# Patient Record
Sex: FEMALE | Race: WHITE | HISPANIC OR LATINO | URBAN - METROPOLITAN AREA
[De-identification: names, ages, dates, MRNs, and addresses within clinical notes are randomized per-mention and may not be internally consistent; named-entity substitution may affect disease eponyms.]

---

## 2017-08-18 ENCOUNTER — EMERGENCY (EMERGENCY)
Facility: HOSPITAL | Age: 52
LOS: 1 days | Discharge: HOME | End: 2017-08-18
Attending: EMERGENCY MEDICINE | Admitting: EMERGENCY MEDICINE
Payer: MEDICAID

## 2017-08-18 VITALS
RESPIRATION RATE: 16 BRPM | TEMPERATURE: 99 F | OXYGEN SATURATION: 100 % | DIASTOLIC BLOOD PRESSURE: 79 MMHG | SYSTOLIC BLOOD PRESSURE: 119 MMHG | HEART RATE: 71 BPM

## 2017-08-18 VITALS
OXYGEN SATURATION: 100 % | TEMPERATURE: 99 F | HEART RATE: 69 BPM | DIASTOLIC BLOOD PRESSURE: 75 MMHG | SYSTOLIC BLOOD PRESSURE: 135 MMHG | RESPIRATION RATE: 16 BRPM

## 2017-08-18 PROCEDURE — 99285 EMERGENCY DEPT VISIT HI MDM: CPT

## 2017-08-18 PROCEDURE — 76642 ULTRASOUND BREAST LIMITED: CPT | Mod: 26,RT

## 2017-08-18 NOTE — ED ADULT TRIAGE NOTE - CHIEF COMPLAINT QUOTE
Pt c/o redness and swelling with open wound under right breast.  S/P silicone breast implant 4 months ago.  Denies fever

## 2017-08-18 NOTE — ED PROVIDER NOTE - SKIN WOUND DESCRIPTION
Breast exam chaperoned by RIDDHI Olivera with inferior medial areola incision with TTP and visible sutures. No drainage.

## 2017-08-18 NOTE — ED PROVIDER NOTE - ATTENDING CONTRIBUTION TO CARE
Dr. Dahl:  I performed a face to face bedside interview with patient regarding history of present illness, review of symptoms and past medical history. I completed an independent physical exam.  I have discussed patient's plan of care with PA.   I agree with note as stated above, having amended the EMR as needed to reflect my findings.   This includes HISTORY OF PRESENT ILLNESS, HIV, PAST MEDICAL/SURGICAL/FAMILY/SOCIAL HISTORY, ALLERGIES AND HOME MEDICATIONS, REVIEW OF SYSTEMS, PHYSICAL EXAM, and any PROGRESS NOTES during the time I functioned as the attending physician for this patient.    51F h/o silicone implant 4 months ago in [facility] presents with right breast pain around the incision area.  Suture hanging out of incision site; had similar suture on left which patient pulled out but this one lodged and now causing some pain.  Denies fevers and other constitutional symptoms.    Exam:  - nad  - rrr  - ctab   - abd soft, ntnd  - +suture visible inferior to alveola on right breast with no surrounding erythema, induration, or purulence    A/P  - normal post-op changes vs infection  - ultrasound breast  - surgery consult Dr. Dahl:  I performed a face to face bedside interview with patient regarding history of present illness, review of symptoms and past medical history. I completed an independent physical exam.  I have discussed patient's plan of care with PA.   I agree with note as stated above, having amended the EMR as needed to reflect my findings.   This includes HISTORY OF PRESENT ILLNESS, HIV, PAST MEDICAL/SURGICAL/FAMILY/SOCIAL HISTORY, ALLERGIES AND HOME MEDICATIONS, REVIEW OF SYSTEMS, PHYSICAL EXAM, and any PROGRESS NOTES during the time I functioned as the attending physician for this patient.    51F h/o silicone implant 4 months ago in [facility] presents with right breast pain around the incision area.  Suture hanging out of incision site; had similar suture on left which patient pulled out but this one lodged and now causing some pain.  Denies fevers and other constitutional symptoms.    Exam:  - nad  - rrr  - ctab   - abd soft, ntnd  - +suture visible inferior to alveola on right breast with no surrounding erythema, induration, or purulence    A/P  - normal post-op changes vs infection  - ultrasound breast  - surgery consult for follow up

## 2017-08-18 NOTE — ED PROVIDER NOTE - PROGRESS NOTE DETAILS
mari dukes: pt to be signed out to night team- called US prelim was no obvious collection. awaiting offical read. pt stable. spoke to Dr. Armendariz- no abscess then pt to f.u outpatient no fluid collection- dc home with plastics f.u agrees with plan, return precautions given.

## 2017-08-18 NOTE — ED PROVIDER NOTE - MEDICAL DECISION MAKING DETAILS
52 y/o female, s/p breast argumentation 4 months ago, with right breast pain. Will obtain US of right breaths r/o abscess and surgery consult as pt has no f/u. 50 y/o female, s/p breast argumentation 4 months ago, with right breast pain. Will obtain US of right breast r/o abscess. Consult plastics as needed/ for followup.

## 2017-08-18 NOTE — ED PROVIDER NOTE - PLAN OF CARE
Rest, drink plenty of fluids.  Advance activity as tolerated.  Continue all previously prescribed medications as directed. Follow up with Plastic Surgery- Referral lists provided. Follow up with your primary care physician in 48-72 hours- bring copies of your results.  Return to the Emergency Department for fevers, worsening or persistent symptoms OR ANY NEW OR CONCERNING SYMPTOMS.

## 2017-08-18 NOTE — ED PROVIDER NOTE - CARE PLAN
Instructions for follow-up, activity and diet:	Rest, drink plenty of fluids.  Advance activity as tolerated.  Continue all previously prescribed medications as directed. Follow up with Plastic Surgery- Referral lists provided. Follow up with your primary care physician in 48-72 hours- bring copies of your results.  Return to the Emergency Department for fevers, worsening or persistent symptoms OR ANY NEW OR CONCERNING SYMPTOMS. Principal Discharge DX:	Breast pain, right  Instructions for follow-up, activity and diet:	Rest, drink plenty of fluids.  Advance activity as tolerated.  Continue all previously prescribed medications as directed. Follow up with Plastic Surgery- Referral lists provided. Follow up with your primary care physician in 48-72 hours- bring copies of your results.  Return to the Emergency Department for fevers, worsening or persistent symptoms OR ANY NEW OR CONCERNING SYMPTOMS.

## 2017-08-18 NOTE — ED PROVIDER NOTE - OBJECTIVE STATEMENT
50 y/o female, with no significant PMHx, presents to the ED s/p breast augmentation with silicone 4 months ago in [place]. Pt was not followed up since. Presents to the ED with right breast pain. Reports removing scab from incision site and noticing suture strings, which pt was unable to remove. Denies fever, chills, CP, SOB, urinary Sxs, and any other complaints. Pt is postmenopause.

## 2018-08-01 ENCOUNTER — OUTPATIENT (OUTPATIENT)
Dept: OUTPATIENT SERVICES | Facility: HOSPITAL | Age: 53
LOS: 1 days | End: 2018-08-01
Payer: MEDICAID

## 2018-08-01 VITALS
WEIGHT: 151.68 LBS | SYSTOLIC BLOOD PRESSURE: 116 MMHG | TEMPERATURE: 98 F | DIASTOLIC BLOOD PRESSURE: 75 MMHG | OXYGEN SATURATION: 97 % | RESPIRATION RATE: 16 BRPM | HEART RATE: 72 BPM | HEIGHT: 63 IN

## 2018-08-01 VITALS — WEIGHT: 151.68 LBS | HEIGHT: 63 IN

## 2018-08-01 DIAGNOSIS — Z98.890 OTHER SPECIFIED POSTPROCEDURAL STATES: Chronic | ICD-10-CM

## 2018-08-01 DIAGNOSIS — T83.711S: ICD-10-CM

## 2018-08-01 DIAGNOSIS — Z98.82 BREAST IMPLANT STATUS: Chronic | ICD-10-CM

## 2018-08-01 DIAGNOSIS — N94.9 UNSPECIFIED CONDITION ASSOCIATED WITH FEMALE GENITAL ORGANS AND MENSTRUAL CYCLE: ICD-10-CM

## 2018-08-01 LAB
APPEARANCE UR: CLEAR — SIGNIFICANT CHANGE UP
BACTERIA # UR AUTO: ABNORMAL /HPF
BILIRUB UR-MCNC: NEGATIVE — SIGNIFICANT CHANGE UP
COLOR SPEC: YELLOW — SIGNIFICANT CHANGE UP
COMMENT - URINE: SIGNIFICANT CHANGE UP
DIFF PNL FLD: ABNORMAL
EPI CELLS # UR: ABNORMAL
GLUCOSE UR QL: NEGATIVE — SIGNIFICANT CHANGE UP
GRAN CASTS # UR COMP ASSIST: ABNORMAL
HCT VFR BLD CALC: 41.5 % — SIGNIFICANT CHANGE UP (ref 34.5–45)
HGB BLD-MCNC: 13.2 G/DL — SIGNIFICANT CHANGE UP (ref 11.5–15.5)
KETONES UR-MCNC: NEGATIVE — SIGNIFICANT CHANGE UP
LEUKOCYTE ESTERASE UR-ACNC: ABNORMAL
MCHC RBC-ENTMCNC: 28.1 PG — SIGNIFICANT CHANGE UP (ref 27–34)
MCHC RBC-ENTMCNC: 31.7 GM/DL — LOW (ref 32–36)
MCV RBC AUTO: 88.6 FL — SIGNIFICANT CHANGE UP (ref 80–100)
NITRITE UR-MCNC: NEGATIVE — SIGNIFICANT CHANGE UP
PH UR: 6 — SIGNIFICANT CHANGE UP (ref 5–8)
PLATELET # BLD AUTO: 241 K/UL — SIGNIFICANT CHANGE UP (ref 150–400)
PROT UR-MCNC: 15
RBC # BLD: 4.68 M/UL — SIGNIFICANT CHANGE UP (ref 3.8–5.2)
RBC # FLD: 12 % — SIGNIFICANT CHANGE UP (ref 10.3–14.5)
RBC CASTS # UR COMP ASSIST: SIGNIFICANT CHANGE UP /HPF (ref 0–4)
SP GR SPEC: 1.02 — SIGNIFICANT CHANGE UP (ref 1.01–1.02)
UROBILINOGEN FLD QL: NEGATIVE — SIGNIFICANT CHANGE UP
WBC # BLD: 5.8 K/UL — SIGNIFICANT CHANGE UP (ref 3.8–10.5)
WBC # FLD AUTO: 5.8 K/UL — SIGNIFICANT CHANGE UP (ref 3.8–10.5)
WBC UR QL: SIGNIFICANT CHANGE UP /HPF (ref 0–5)

## 2018-08-01 PROCEDURE — 81001 URINALYSIS AUTO W/SCOPE: CPT

## 2018-08-01 PROCEDURE — 87086 URINE CULTURE/COLONY COUNT: CPT

## 2018-08-01 PROCEDURE — 85027 COMPLETE CBC AUTOMATED: CPT

## 2018-08-01 PROCEDURE — G0463: CPT

## 2018-08-01 PROCEDURE — 93005 ELECTROCARDIOGRAM TRACING: CPT

## 2018-08-01 PROCEDURE — 36415 COLL VENOUS BLD VENIPUNCTURE: CPT

## 2018-08-01 PROCEDURE — 93010 ELECTROCARDIOGRAM REPORT: CPT | Mod: NC

## 2018-08-01 NOTE — H&P PST ADULT - PSH
This note was copied from the mother's chart.  Reviewed the risks of supplementation.  Discussed the adequacy of colostrum.  Instructed on normal  feeding and sleeping patterns.  Encouraged mother to feed the infant on cue, a minimum of 8 times in 24 hours prior to supplementation to promote appropriate breast stimulation for adequate milk supply.  Discussed preferred alternative feeding methods, such as supplementing the infant via breast with SNS, syringe feeding, cup feeding, spoon feeding and finger feeding.  Discussed risks and encouraged to avoid artificial bottles and nipples.  Chooses to supplement via bottle.  Safely taught how to feed infant via chosen method.  Demonstrated by nurse and pt return demonstrates proper and safe usage.  States understand and provided appropriate recall of all information.   H/O breast augmentation  2017  H/O partial cystectomy  left breast benign 03/2018  H/O vaginal surgery  Transvaginal mesh implanted 2016  History of abdominoplasty  2015

## 2018-08-01 NOTE — H&P PST ADULT - NSANTHOSAYNRD_GEN_A_CORE
No. TIESHA screening performed.  STOP BANG Legend: 0-2 = LOW Risk; 3-4 = INTERMEDIATE Risk; 5-8 = HIGH Risk

## 2018-08-01 NOTE — H&P PST ADULT - FAMILY HISTORY
Mother  Still living? Yes, Estimated age: Age Unknown  Family history of esophageal cancer, Age at diagnosis: Age Unknown  Family history of breast cancer, Age at diagnosis: Age Unknown     Father  Still living? Yes, Estimated age: Age Unknown  Family history of Alzheimer's disease, Age at diagnosis: Age Unknown     Sibling  Still living? Yes, Estimated age: Age Unknown  Family history of cervical cancer, Age at diagnosis: Age Unknown

## 2018-08-01 NOTE — H&P PST ADULT - HISTORY OF PRESENT ILLNESS
51 y/o female presents for PST. As per patient she was experiencing urinary incontinence and had vaginal mesh implanted which is now causing problems during sexual intercourse. Pt followed up with Dr. Munoz who referred her for excision of mesh.

## 2018-08-02 LAB
CULTURE RESULTS: SIGNIFICANT CHANGE UP
SPECIMEN SOURCE: SIGNIFICANT CHANGE UP

## 2018-08-09 ENCOUNTER — TRANSCRIPTION ENCOUNTER (OUTPATIENT)
Age: 53
End: 2018-08-09

## 2018-08-10 ENCOUNTER — RESULT REVIEW (OUTPATIENT)
Age: 53
End: 2018-08-10

## 2018-08-10 ENCOUNTER — OUTPATIENT (OUTPATIENT)
Dept: OUTPATIENT SERVICES | Facility: HOSPITAL | Age: 53
LOS: 1 days | End: 2018-08-10
Payer: MEDICAID

## 2018-08-10 VITALS
OXYGEN SATURATION: 95 % | TEMPERATURE: 97 F | SYSTOLIC BLOOD PRESSURE: 126 MMHG | HEART RATE: 68 BPM | DIASTOLIC BLOOD PRESSURE: 80 MMHG | RESPIRATION RATE: 16 BRPM

## 2018-08-10 VITALS
SYSTOLIC BLOOD PRESSURE: 126 MMHG | DIASTOLIC BLOOD PRESSURE: 72 MMHG | HEIGHT: 63 IN | HEART RATE: 72 BPM | RESPIRATION RATE: 16 BRPM | TEMPERATURE: 98 F | OXYGEN SATURATION: 98 % | WEIGHT: 151.68 LBS

## 2018-08-10 DIAGNOSIS — Z98.890 OTHER SPECIFIED POSTPROCEDURAL STATES: Chronic | ICD-10-CM

## 2018-08-10 DIAGNOSIS — T83.711S: ICD-10-CM

## 2018-08-10 DIAGNOSIS — Z98.82 BREAST IMPLANT STATUS: Chronic | ICD-10-CM

## 2018-08-10 PROCEDURE — 88300 SURGICAL PATH GROSS: CPT | Mod: 26

## 2018-08-10 PROCEDURE — 88300 SURGICAL PATH GROSS: CPT

## 2018-08-10 PROCEDURE — 57295 REVISE VAG GRAFT VIA VAGINA: CPT

## 2018-08-10 PROCEDURE — C1763: CPT

## 2018-08-10 RX ORDER — TRAMADOL HYDROCHLORIDE 50 MG/1
1 TABLET ORAL
Qty: 20 | Refills: 0 | OUTPATIENT
Start: 2018-08-10

## 2018-08-10 RX ORDER — OXYCODONE AND ACETAMINOPHEN 5; 325 MG/1; MG/1
1 TABLET ORAL EVERY 4 HOURS
Qty: 0 | Refills: 0 | Status: DISCONTINUED | OUTPATIENT
Start: 2018-08-10 | End: 2018-08-10

## 2018-08-10 RX ORDER — SODIUM CHLORIDE 9 MG/ML
1000 INJECTION, SOLUTION INTRAVENOUS
Qty: 0 | Refills: 0 | Status: DISCONTINUED | OUTPATIENT
Start: 2018-08-10 | End: 2018-08-10

## 2018-08-10 RX ORDER — ACETAMINOPHEN 500 MG
1000 TABLET ORAL ONCE
Qty: 0 | Refills: 0 | Status: COMPLETED | OUTPATIENT
Start: 2018-08-10 | End: 2018-08-10

## 2018-08-10 RX ORDER — ONDANSETRON 8 MG/1
4 TABLET, FILM COATED ORAL ONCE
Qty: 0 | Refills: 0 | Status: DISCONTINUED | OUTPATIENT
Start: 2018-08-10 | End: 2018-08-10

## 2018-08-10 RX ORDER — TRAMADOL HYDROCHLORIDE 50 MG/1
1 TABLET ORAL
Qty: 0 | Refills: 0 | COMMUNITY

## 2018-08-10 RX ORDER — OXYCODONE AND ACETAMINOPHEN 5; 325 MG/1; MG/1
1 TABLET ORAL ONCE
Qty: 0 | Refills: 0 | Status: DISCONTINUED | OUTPATIENT
Start: 2018-08-10 | End: 2018-08-10

## 2018-08-10 RX ORDER — HYDROMORPHONE HYDROCHLORIDE 2 MG/ML
0.5 INJECTION INTRAMUSCULAR; INTRAVENOUS; SUBCUTANEOUS ONCE
Qty: 0 | Refills: 0 | Status: DISCONTINUED | OUTPATIENT
Start: 2018-08-10 | End: 2018-08-10

## 2018-08-10 RX ORDER — OXYCODONE HYDROCHLORIDE 5 MG/1
5 TABLET ORAL EVERY 6 HOURS
Qty: 0 | Refills: 0 | Status: DISCONTINUED | OUTPATIENT
Start: 2018-08-10 | End: 2018-08-10

## 2018-08-10 RX ORDER — HYDROMORPHONE HYDROCHLORIDE 2 MG/ML
0.5 INJECTION INTRAMUSCULAR; INTRAVENOUS; SUBCUTANEOUS
Qty: 0 | Refills: 0 | Status: DISCONTINUED | OUTPATIENT
Start: 2018-08-10 | End: 2018-08-10

## 2018-08-10 RX ADMIN — SODIUM CHLORIDE 50 MILLILITER(S): 9 INJECTION, SOLUTION INTRAVENOUS at 07:25

## 2018-08-10 RX ADMIN — HYDROMORPHONE HYDROCHLORIDE 0.5 MILLIGRAM(S): 2 INJECTION INTRAMUSCULAR; INTRAVENOUS; SUBCUTANEOUS at 12:35

## 2018-08-10 RX ADMIN — HYDROMORPHONE HYDROCHLORIDE 0.5 MILLIGRAM(S): 2 INJECTION INTRAMUSCULAR; INTRAVENOUS; SUBCUTANEOUS at 13:05

## 2018-08-10 RX ADMIN — Medication 400 MILLIGRAM(S): at 13:38

## 2018-08-10 NOTE — PROVIDER CONTACT NOTE (MEDICATION) - SITUATION
patient status vaginal mesh with graft. pain scale of 6/10 after 1 dose of dilaudid 0.5mg given 15 minutes prior

## 2018-08-10 NOTE — PROVIDER CONTACT NOTE (MEDICATION) - ASSESSMENT
abdomen soft non distended. small amount of blood in vaginal pad noted.  pain level relief after 2 doses of dilaudid
abdomen soft and non distended. patient able to void 350cc of clear urine with small amount of vaginal bleeding

## 2018-08-10 NOTE — ASU DISCHARGE PLAN (ADULT/PEDIATRIC). - ACTIVITY LEVEL
no tampons/nothing in vagine x 6 weeks/weight bearing as tolerated/nothing per vagina/no tub baths/no intercourse/no douching

## 2018-08-10 NOTE — BRIEF OPERATIVE NOTE - PROCEDURE
<<-----Click on this checkbox to enter Procedure Removal of mesh from vagina  08/10/2018  extruding mesh pieces removed, repaired and covered with Acell graft  Active  [id number]

## 2018-08-13 LAB — SURGICAL PATHOLOGY STUDY: SIGNIFICANT CHANGE UP

## 2019-01-14 ENCOUNTER — EMERGENCY (EMERGENCY)
Facility: HOSPITAL | Age: 54
LOS: 1 days | Discharge: HOME | End: 2019-01-14
Admitting: EMERGENCY MEDICINE
Payer: MEDICAID

## 2019-01-14 VITALS
TEMPERATURE: 98 F | OXYGEN SATURATION: 97 % | DIASTOLIC BLOOD PRESSURE: 82 MMHG | SYSTOLIC BLOOD PRESSURE: 122 MMHG | HEART RATE: 75 BPM | RESPIRATION RATE: 18 BRPM

## 2019-01-14 DIAGNOSIS — Z98.890 OTHER SPECIFIED POSTPROCEDURAL STATES: Chronic | ICD-10-CM

## 2019-01-14 DIAGNOSIS — Z98.82 BREAST IMPLANT STATUS: Chronic | ICD-10-CM

## 2019-01-14 PROBLEM — R32 UNSPECIFIED URINARY INCONTINENCE: Chronic | Status: ACTIVE | Noted: 2018-08-01

## 2019-01-14 PROBLEM — N94.9 UNSPECIFIED CONDITION ASSOCIATED WITH FEMALE GENITAL ORGANS AND MENSTRUAL CYCLE: Chronic | Status: ACTIVE | Noted: 2018-08-01

## 2019-01-14 PROCEDURE — 99283 EMERGENCY DEPT VISIT LOW MDM: CPT

## 2019-01-14 PROCEDURE — 71046 X-RAY EXAM CHEST 2 VIEWS: CPT | Mod: 26

## 2019-01-14 RX ORDER — FLUTICASONE PROPIONATE 50 MCG
1 SPRAY, SUSPENSION NASAL
Qty: 1 | Refills: 0 | OUTPATIENT
Start: 2019-01-14

## 2019-01-14 RX ORDER — ACETAMINOPHEN 500 MG
650 TABLET ORAL ONCE
Qty: 0 | Refills: 0 | Status: COMPLETED | OUTPATIENT
Start: 2019-01-14 | End: 2019-01-14

## 2019-01-14 RX ADMIN — Medication 650 MILLIGRAM(S): at 16:14

## 2019-01-14 NOTE — ED PROVIDER NOTE - NSFOLLOWUPINSTRUCTIONS_ED_ALL_ED_FT
Use flonase one spray in each nostril twice a day.  Tessalon perles 200mg twice a day as needed for cough.  Take motrin 600mg or tylenol 650mg every 6-8 hours as needed for pain/fever.  Drink plenty of fluids.  Rest, avoid any strenuous activity.  Follow up with your PMD in 2 days.  Return to ED for any persistent fevers, shortness of breath, abdominal pain or any other new/concerning symptoms.

## 2019-01-14 NOTE — ED PROVIDER NOTE - MEDICAL DECISION MAKING DETAILS
52 yo female c no sig pmhx presents to ED c/o runny nose, cough, sore throat, myalgias x 1 week.  likely  viral URI, r/o pneumonia-cxr, supportive tx

## 2019-01-14 NOTE — ED PROVIDER NOTE - OBJECTIVE STATEMENT
52 yo female c no sig pmhx presents to ED c/o runny nose, cough, sore throat, myalgias x 1 week.  +sick contacts at work, works in a salon.  +subjective fever, chills.   +diarrhea 2 days ago which resolved.  Denies any CP, sob, abd pain, recent travel, n/v.

## 2019-05-29 ENCOUNTER — APPOINTMENT (OUTPATIENT)
Dept: SURGICAL ONCOLOGY | Facility: CLINIC | Age: 54
End: 2019-05-29
Payer: MEDICAID

## 2019-05-29 VITALS
OXYGEN SATURATION: 97 % | DIASTOLIC BLOOD PRESSURE: 81 MMHG | SYSTOLIC BLOOD PRESSURE: 123 MMHG | TEMPERATURE: 98.2 F | HEART RATE: 73 BPM

## 2019-05-29 VITALS — TEMPERATURE: 98.2 F | BODY MASS INDEX: 29.06 KG/M2 | WEIGHT: 164 LBS | HEIGHT: 63 IN

## 2019-05-29 DIAGNOSIS — Z86.59 PERSONAL HISTORY OF OTHER MENTAL AND BEHAVIORAL DISORDERS: ICD-10-CM

## 2019-05-29 DIAGNOSIS — Z86.79 PERSONAL HISTORY OF OTHER DISEASES OF THE CIRCULATORY SYSTEM: ICD-10-CM

## 2019-05-29 DIAGNOSIS — Z87.898 PERSONAL HISTORY OF OTHER SPECIFIED CONDITIONS: ICD-10-CM

## 2019-05-29 DIAGNOSIS — N64.4 MASTODYNIA: ICD-10-CM

## 2019-05-29 PROCEDURE — 99204 OFFICE O/P NEW MOD 45 MIN: CPT

## 2019-05-29 NOTE — OB HISTORY
[Postmenopausal] : The patient is postmenopausal [Menopause Age____] : age at menopause was [unfilled] [Menarche Age ____] : menarche age [unfilled]

## 2019-05-31 PROBLEM — Z86.59 HISTORY OF DEPRESSION: Status: RESOLVED | Noted: 2019-05-29 | Resolved: 2019-05-31

## 2019-05-31 PROBLEM — Z87.898 HISTORY OF CHEST PAIN: Status: RESOLVED | Noted: 2019-05-29 | Resolved: 2019-05-31

## 2019-05-31 PROBLEM — Z86.79 HISTORY OF OTHER DISEASES OF THE CIRCULATORY SYSTEM, NOT ELSEWHERE CLASSIFIED: Status: RESOLVED | Noted: 2019-05-29 | Resolved: 2019-05-31

## 2019-05-31 RX ORDER — CHOLECALCIFEROL (VITAMIN D3) 125 MCG
TABLET ORAL
Refills: 0 | Status: ACTIVE | COMMUNITY

## 2019-05-31 NOTE — CONSULT LETTER
[Dear  ___] : Dear  [unfilled], [Consult Letter:] : I had the pleasure of evaluating your patient, [unfilled]. [Please see my note below.] : Please see my note below. [Sincerely,] : Sincerely, [FreeTextEntry3] : Kamar Linder MD FACS

## 2019-05-31 NOTE — HISTORY OF PRESENT ILLNESS
[de-identified] : 52 y/o female presents for initial consultation for left breast pain. She states she sometimes feels like something is leaking in her left lateral breast. \par \par Hx significant for bilateral silicone breast implants placed in [facility] two years ago. \par \par Bilateral MMG/US 1/29/19: BIRADS-1. \par Diagnostic left breast US 4/29/19: BIRADS-2. \par \par She c/o joint pain and generalized pain in her neck that radiates down to her back and . \par FMHx: mother had breast cancer, dx in 70s. Sister has cervical cancer. \par Denies any previous breast biopsies.\par Denies palpable breast masses, nipple discharge, nipple retraction/inversion, or skin changes. \par Denies constitutional symptoms. \par Denies fever or chills.

## 2019-05-31 NOTE — PHYSICAL EXAM
[Normal] : supple, no neck mass and thyroid not enlarged [Normal Neck Lymph Nodes] : normal neck lymph nodes  [Normal Supraclavicular Lymph Nodes] : normal supraclavicular lymph nodes [Normal Groin Lymph Nodes] : normal groin lymph nodes [Normal Axillary Lymph Nodes] : normal axillary lymph nodes [Normal] : oriented to person, place and time, with appropriate affect [de-identified] : Implants in good position. No masses or adenopathy bilaterally.

## 2019-05-31 NOTE — ASSESSMENT
[FreeTextEntry1] : Left breast pain likely fibrocystic changes \par Reassured patient that index of suspicion for malignancy is low. \par Will get breast MRI to rule out implant rupture.\par Discussed implants as cause of total body joint pain \par May refer to plastic surgery if her symptoms persist\par Recommend Vitamin E and/or primrose oil prn for breast pain. \par RTO 1 month.

## 2019-05-31 NOTE — ADDENDUM
[FreeTextEntry1] : I, Andres Jaquez, acted solely as a scribe for Dr. Kamar Linder on this date 05/29/2019.

## 2019-06-14 ENCOUNTER — APPOINTMENT (OUTPATIENT)
Dept: MRI IMAGING | Facility: CLINIC | Age: 54
End: 2019-06-14

## 2019-08-12 ENCOUNTER — APPOINTMENT (OUTPATIENT)
Dept: OBGYN | Facility: CLINIC | Age: 54
End: 2019-08-12

## 2019-09-18 ENCOUNTER — EMERGENCY (EMERGENCY)
Facility: HOSPITAL | Age: 54
LOS: 1 days | Discharge: HOME | End: 2019-09-18
Attending: EMERGENCY MEDICINE | Admitting: EMERGENCY MEDICINE
Payer: MEDICAID

## 2019-09-18 VITALS
RESPIRATION RATE: 18 BRPM | TEMPERATURE: 99 F | OXYGEN SATURATION: 100 % | HEART RATE: 78 BPM | DIASTOLIC BLOOD PRESSURE: 75 MMHG | SYSTOLIC BLOOD PRESSURE: 113 MMHG

## 2019-09-18 DIAGNOSIS — Z98.890 OTHER SPECIFIED POSTPROCEDURAL STATES: Chronic | ICD-10-CM

## 2019-09-18 DIAGNOSIS — Z98.82 BREAST IMPLANT STATUS: Chronic | ICD-10-CM

## 2019-09-18 PROCEDURE — 99283 EMERGENCY DEPT VISIT LOW MDM: CPT

## 2019-09-18 PROCEDURE — 71046 X-RAY EXAM CHEST 2 VIEWS: CPT | Mod: 26

## 2019-09-18 RX ORDER — IBUPROFEN 200 MG
600 TABLET ORAL ONCE
Refills: 0 | Status: COMPLETED | OUTPATIENT
Start: 2019-09-18 | End: 2019-09-18

## 2019-09-18 RX ADMIN — Medication 600 MILLIGRAM(S): at 14:13

## 2019-09-18 NOTE — ED PROVIDER NOTE - PATIENT PORTAL LINK FT
You can access the FollowMyHealth Patient Portal offered by [facility] by registering at the following website: [email or web address]. By joining [employer]’s FollowMyHealth portal, you will also be able to view your health information using other applications (apps) compatible with our system.

## 2019-09-18 NOTE — ED PROVIDER NOTE - PSH
H/O breast augmentation  2017  H/O partial cystectomy  left breast benign 03/2018  H/O vaginal surgery  Transvaginal mesh implanted 2016  History of abdominoplasty  2015

## 2019-09-18 NOTE — ED PROVIDER NOTE - CLINICAL SUMMARY MEDICAL DECISION MAKING FREE TEXT BOX
53 y/o female with cough and sore throat since yesterday. Viral syndrome. R/O bacterial pneumonia. Obtain UCG, chest x-ray, give NSAID's, steroids, and cough supressant. Discharge with PCP follow up. 55 y/o female with cough and sore throat since yesterday. Viral syndrome. R/O bacterial pneumonia. Obtain UCG, chest x-ray, give NSAID's, steroids, and cough suppressant. Discharge with PCP follow up.

## 2019-09-18 NOTE — ED PROVIDER NOTE - NSFOLLOWUPINSTRUCTIONS_ED_ALL_ED_FT
Rest, drink plenty of fluids.  Use cough suppressant tessalon , 1 tab every 8 hours as needed for cough. Do not drink/drive/operate machinery while on this medication, it can make you drowsy.   Follow up with your primary care physician in 48-72 hours- bring copies of your results.  Return to the Emergency Department for worsening or persistent symptoms OR ANY NEW OR CONCERNING SYMPTOMS.

## 2019-09-18 NOTE — ED PROVIDER NOTE - PHYSICAL EXAMINATION
HENMT: Erythema to posterior pharynx.   Uvula midline.  No tonsilar exudates.  Small area of ulcer.  No trismus.  Full ROM of neck.

## 2019-09-18 NOTE — ED PROVIDER NOTE - OBJECTIVE STATEMENT
54 year old female with presents to the ED with cough and sore throat onset yesterday. As per patient she is also experiencing body aches and nausea. The patient reports she may have been exposed to sick contacts at work, she works as a hairdresser. Patient denies vomiting, diarrhea, fever, chills, recent travel, or any other medical problems.     PMH: Urinary incontinence    Vaginal symptom  Vaginal mesh implanted  PSH: H/O breast augmentation  2017  H/O partial cystectomy  left breast benign 03/2018  H/O vaginal surgery  Transvaginal mesh implanted 2016  History of abdominoplasty  2015  FH/SH: non-contributory, except as noted in the HPI  Allergies: No known drug allergies  Immunizations: Up-to-date  Medications: No chronic home medications

## 2019-09-18 NOTE — ED PROVIDER NOTE - PROGRESS NOTE DETAILS
RIDDHI Blanco: xray without pneumonia. discussed with dr. silvia souza dc with tessalon and supportive care and outpatient PMD followup.

## 2020-06-06 ENCOUNTER — EMERGENCY (EMERGENCY)
Facility: HOSPITAL | Age: 55
LOS: 1 days | Discharge: HOME | End: 2020-06-06
Attending: EMERGENCY MEDICINE | Admitting: EMERGENCY MEDICINE
Payer: MEDICAID

## 2020-06-06 VITALS
SYSTOLIC BLOOD PRESSURE: 126 MMHG | RESPIRATION RATE: 18 BRPM | TEMPERATURE: 98 F | DIASTOLIC BLOOD PRESSURE: 71 MMHG | HEART RATE: 76 BPM | OXYGEN SATURATION: 100 %

## 2020-06-06 VITALS
TEMPERATURE: 98 F | SYSTOLIC BLOOD PRESSURE: 123 MMHG | HEART RATE: 73 BPM | RESPIRATION RATE: 18 BRPM | DIASTOLIC BLOOD PRESSURE: 73 MMHG | OXYGEN SATURATION: 97 %

## 2020-06-06 DIAGNOSIS — Z98.890 OTHER SPECIFIED POSTPROCEDURAL STATES: Chronic | ICD-10-CM

## 2020-06-06 DIAGNOSIS — Z98.82 BREAST IMPLANT STATUS: Chronic | ICD-10-CM

## 2020-06-06 LAB
ALBUMIN SERPL ELPH-MCNC: 3.9 G/DL — SIGNIFICANT CHANGE UP (ref 3.3–5)
ALP SERPL-CCNC: 70 U/L — SIGNIFICANT CHANGE UP (ref 40–120)
ALT FLD-CCNC: 16 U/L — SIGNIFICANT CHANGE UP (ref 4–33)
AMORPH CRY # UR COMP ASSIST: PRESENT — SIGNIFICANT CHANGE UP (ref 0–0)
ANION GAP SERPL CALC-SCNC: 11 MMO/L — SIGNIFICANT CHANGE UP (ref 7–14)
APPEARANCE UR: CLEAR — SIGNIFICANT CHANGE UP
AST SERPL-CCNC: 16 U/L — SIGNIFICANT CHANGE UP (ref 4–32)
BASOPHILS # BLD AUTO: 0.04 K/UL — SIGNIFICANT CHANGE UP (ref 0–0.2)
BASOPHILS NFR BLD AUTO: 0.8 % — SIGNIFICANT CHANGE UP (ref 0–2)
BILIRUB SERPL-MCNC: < 0.2 MG/DL — LOW (ref 0.2–1.2)
BILIRUB UR-MCNC: NEGATIVE — SIGNIFICANT CHANGE UP
BLOOD UR QL VISUAL: NEGATIVE — SIGNIFICANT CHANGE UP
BUN SERPL-MCNC: 14 MG/DL — SIGNIFICANT CHANGE UP (ref 7–23)
CALCIUM SERPL-MCNC: 9.1 MG/DL — SIGNIFICANT CHANGE UP (ref 8.4–10.5)
CHLORIDE SERPL-SCNC: 107 MMOL/L — SIGNIFICANT CHANGE UP (ref 98–107)
CO2 SERPL-SCNC: 23 MMOL/L — SIGNIFICANT CHANGE UP (ref 22–31)
COLOR SPEC: YELLOW — SIGNIFICANT CHANGE UP
CREAT SERPL-MCNC: 0.56 MG/DL — SIGNIFICANT CHANGE UP (ref 0.5–1.3)
EOSINOPHIL # BLD AUTO: 0.09 K/UL — SIGNIFICANT CHANGE UP (ref 0–0.5)
EOSINOPHIL NFR BLD AUTO: 1.7 % — SIGNIFICANT CHANGE UP (ref 0–6)
EPI CELLS # UR: SIGNIFICANT CHANGE UP
GLUCOSE SERPL-MCNC: 112 MG/DL — HIGH (ref 70–99)
GLUCOSE UR-MCNC: NEGATIVE — SIGNIFICANT CHANGE UP
HCT VFR BLD CALC: 38.1 % — SIGNIFICANT CHANGE UP (ref 34.5–45)
HGB BLD-MCNC: 12.2 G/DL — SIGNIFICANT CHANGE UP (ref 11.5–15.5)
IMM GRANULOCYTES NFR BLD AUTO: 0.4 % — SIGNIFICANT CHANGE UP (ref 0–1.5)
KETONES UR-MCNC: NEGATIVE — SIGNIFICANT CHANGE UP
LEUKOCYTE ESTERASE UR-ACNC: NEGATIVE — SIGNIFICANT CHANGE UP
LYMPHOCYTES # BLD AUTO: 1.6 K/UL — SIGNIFICANT CHANGE UP (ref 1–3.3)
LYMPHOCYTES # BLD AUTO: 30.5 % — SIGNIFICANT CHANGE UP (ref 13–44)
MCHC RBC-ENTMCNC: 27.6 PG — SIGNIFICANT CHANGE UP (ref 27–34)
MCHC RBC-ENTMCNC: 32 % — SIGNIFICANT CHANGE UP (ref 32–36)
MCV RBC AUTO: 86.2 FL — SIGNIFICANT CHANGE UP (ref 80–100)
MONOCYTES # BLD AUTO: 0.37 K/UL — SIGNIFICANT CHANGE UP (ref 0–0.9)
MONOCYTES NFR BLD AUTO: 7.1 % — SIGNIFICANT CHANGE UP (ref 2–14)
NEUTROPHILS # BLD AUTO: 3.12 K/UL — SIGNIFICANT CHANGE UP (ref 1.8–7.4)
NEUTROPHILS NFR BLD AUTO: 59.5 % — SIGNIFICANT CHANGE UP (ref 43–77)
NITRITE UR-MCNC: NEGATIVE — SIGNIFICANT CHANGE UP
NRBC # FLD: 0 K/UL — SIGNIFICANT CHANGE UP (ref 0–0)
PH UR: 6 — SIGNIFICANT CHANGE UP (ref 5–8)
PLATELET # BLD AUTO: 250 K/UL — SIGNIFICANT CHANGE UP (ref 150–400)
PMV BLD: 8.9 FL — SIGNIFICANT CHANGE UP (ref 7–13)
POTASSIUM SERPL-MCNC: 4.1 MMOL/L — SIGNIFICANT CHANGE UP (ref 3.5–5.3)
POTASSIUM SERPL-SCNC: 4.1 MMOL/L — SIGNIFICANT CHANGE UP (ref 3.5–5.3)
PROT SERPL-MCNC: 6.4 G/DL — SIGNIFICANT CHANGE UP (ref 6–8.3)
PROT UR-MCNC: 20 — SIGNIFICANT CHANGE UP
RBC # BLD: 4.42 M/UL — SIGNIFICANT CHANGE UP (ref 3.8–5.2)
RBC # FLD: 13.3 % — SIGNIFICANT CHANGE UP (ref 10.3–14.5)
RBC CASTS # UR COMP ASSIST: SIGNIFICANT CHANGE UP (ref 0–?)
SODIUM SERPL-SCNC: 141 MMOL/L — SIGNIFICANT CHANGE UP (ref 135–145)
SP GR SPEC: 1.03 — SIGNIFICANT CHANGE UP (ref 1–1.04)
UROBILINOGEN FLD QL: NORMAL — SIGNIFICANT CHANGE UP
WBC # BLD: 5.24 K/UL — SIGNIFICANT CHANGE UP (ref 3.8–10.5)
WBC # FLD AUTO: 5.24 K/UL — SIGNIFICANT CHANGE UP (ref 3.8–10.5)
WBC UR QL: SIGNIFICANT CHANGE UP (ref 0–?)

## 2020-06-06 PROCEDURE — 99283 EMERGENCY DEPT VISIT LOW MDM: CPT

## 2020-06-06 RX ORDER — KETOROLAC TROMETHAMINE 30 MG/ML
15 SYRINGE (ML) INJECTION ONCE
Refills: 0 | Status: DISCONTINUED | OUTPATIENT
Start: 2020-06-06 | End: 2020-06-06

## 2020-06-06 RX ORDER — IBUPROFEN 200 MG
1 TABLET ORAL
Qty: 28 | Refills: 0
Start: 2020-06-06 | End: 2020-06-12

## 2020-06-06 RX ORDER — CYCLOBENZAPRINE HYDROCHLORIDE 10 MG/1
1 TABLET, FILM COATED ORAL
Qty: 21 | Refills: 0
Start: 2020-06-06 | End: 2020-06-12

## 2020-06-06 RX ORDER — DIAZEPAM 5 MG
5 TABLET ORAL ONCE
Refills: 0 | Status: DISCONTINUED | OUTPATIENT
Start: 2020-06-06 | End: 2020-06-06

## 2020-06-06 RX ORDER — LIDOCAINE 4 G/100G
1 CREAM TOPICAL ONCE
Refills: 0 | Status: COMPLETED | OUTPATIENT
Start: 2020-06-06 | End: 2020-06-06

## 2020-06-06 RX ADMIN — Medication 5 MILLIGRAM(S): at 10:38

## 2020-06-06 RX ADMIN — Medication 15 MILLIGRAM(S): at 10:39

## 2020-06-06 RX ADMIN — LIDOCAINE 1 PATCH: 4 CREAM TOPICAL at 10:39

## 2020-06-06 NOTE — ED ADULT NURSE REASSESSMENT NOTE - NS ED NURSE REASSESS COMMENT FT1
Pt states pain is tolerable at this time, Pt states someone will be picking her up, RIDDHI Choe aware and is preparing discharge paperwork. IV removed at this time. Will continue to monitor.

## 2020-06-06 NOTE — ED PROVIDER NOTE - PROGRESS NOTE DETAILS
PA GUY: Patient reassessed, sitting comfortably in chair in NAD, denies any complaints. States feeling better, symptoms improved. Pt states she has someone to pick her up. Discussed with attending, patient can be discharged home to f/u with PCP and ortho. The patient was given verbal and written discharge instructions. Specifically, instructions when to return to the ED and when to seek follow-up from their pcp was discussed. Any specialty follow-up was discussed, including how to make an appointment.  Instructions were discussed in simple, plain language and was understood by the patient. The patient understands that should their symptoms worsen or any new symptoms arise, they should return to the ED immediately for further evaluation. All pt's questions were answered. Patient verbalizes understanding.

## 2020-06-06 NOTE — ED PROVIDER NOTE - PATIENT PORTAL LINK FT
You can access the FollowMyHealth Patient Portal offered by [facility] by registering at the following website: [email or web address]. By joining [employer]’s FollowMyHealth portal, you will also be able to view your health information using other applications (apps) compatible with our system.

## 2020-06-06 NOTE — ED PROVIDER NOTE - CLINICAL SUMMARY MEDICAL DECISION MAKING FREE TEXT BOX
55 yo F, nonsmoker with PMH of disc herniation of lumbar spine presents to ER c/o left flank pain x3 weeks. ambulatory, no focal neuro deficits. Likely disc herniation, MSK back pain, low suspicious for pyelonephritis, renal colic. Plan: pain control with toradol, valium, warm compress, lidoderm patch and reassess.

## 2020-06-06 NOTE — ED PROVIDER NOTE - ATTENDING CONTRIBUTION TO CARE
I performed a face to face evaluation of this patient and obtained a history and performed a full exam.  I agree with the history, physical exam and plan of the PA.    Brief HPI:  55 yo F, nonsmoker with PMH of disc herniation of lumbar spine presents to ER c/o left flank pain x3 weeks.  Denies trauma.  No urinary sx.     Vitals:   Reviewed    Exam:    GEN:  Non-toxic appearing, non-distressed, speaking full sentences, non-diaphoretic, AAOx3  HEENT:  NCAT, neck supple, EOMI, PERRLA, sclera anicteric, no conjunctival pallor or injection, no stridor, normal voice, no tonsillar exudate, uvula midline, tympanic membranes and external auditory canals normal appearing bilaterally   CV:  regular rhythm and rate, s1/s2 audible, no murmurs, rubs or gallops, peripheral pulses 2+ and symmetric  PULM:  non-labored respirations, lungs clear to auscultation bilaterally, no wheezes, crackles or rales  ABD:  non distended, non-tender, no rebound, no guarding, negative Andrade's sign, bowel sounds normal, no cvat  MSK:  no gross deformity, non-tender extremities and joints, range of motion grossly normal appearing, no extremity edema, extremities warm and well perfused   BACK:  No midline c/t/l spine tenderness or stepoff, +left sided lumbar paraspinal tenderness  NEURO:  AAOx3, CN II-XII intact, motor 5/5 in upper and lower extremities bilaterally, sensation grossly intact in extremities and trunk, finger to nose testing wnl, no nystagmus, negative Romberg, no pronator drift, no gait deficit, straight leg raise negative ipsilaterally and contralaterally   SKIN:  warm, dry, no rash or vesicles     A/P:  55 yo F, nonsmoker with PMH of disc herniation of lumbar spine presents to ER c/o left flank pain x3 weeks.  Low concern for spinal epidural abscess, transverse myelitis, acute cord compression, or cauda equina syndrome at this time.  The patient possesses no red flags including fever, chills, history of malignancy, history of intravenous drug use, recent spinal instrumentation, predominant nocturnal pain, history of immunosuppression, pelvic or perineal anesthesia or paresthesia, or fecal or urinary incontinence or retention.  Low c/f renal colic, AAA, or dissection given tender left paraspinal musculature.  Plan for labs, ua, analgesia.  If pain not relieved, will consider CT.  Dispo pending.

## 2020-06-06 NOTE — ED ADULT TRIAGE NOTE - CHIEF COMPLAINT QUOTE
Pt c/o lt flank pain x3 daus--worse with movement . Pt states she cant sleep with the pain. Pt denies any other problems

## 2020-06-06 NOTE — ED ADULT NURSE NOTE - OBJECTIVE STATEMENT
Patient received in intake exam room 7. alert and oriented x3, ambulatory. Pt c/o lt flank pain x3 days--worse with movement . Pt states she cant sleep with the pain. Denies fevers, chills, chest pain, sob, headaches. Labs sent. 20 gauge IV placed to right arm. Comfort measures provided. will monitor.

## 2020-06-06 NOTE — ED PROVIDER NOTE - NONTENDER LOCATION
left upper quadrant/umbilical/right upper quadrant/suprapubic/right costovertebral angle/left lower quadrant/right lower quadrant/periumbilical/left costovertebral angle

## 2020-06-06 NOTE — ED PROVIDER NOTE - OBJECTIVE STATEMENT
55 yo F, nonsmoker with PMH of disc herniation of lumbar spine presents to ER c/o left flank pain x3 weeks. Pt states having severe pain, "unable to describe character of pain", 9/10, intermittent, worse with movements and ambulation. Reports left 3rd toe is painful, inflamed with numbness. States pain was mild in the beginning and got worse yesterday. Reports also having left arm pain, inflamed for 3 weeks, unsure if they're related, denies neck pain. Admits taking Tylenol w/o much improvement. Admits similar pain in the past. Denies any fever, chills, headache, dizziness, n/v/d, abdominal pain, dysuria, hematuria, chest pain, sob, weakness, numbness, urinary/fecal incontinence, saddle anesthesia, or any other complaints. 55 yo F, nonsmoker with PMH of disc herniation of lumbar spine presents to ER c/o left flank pain x3 weeks. Pt states having severe pain, "unable to describe character of pain", 9/10, intermittent, worse with movements and ambulation. Reports left 3rd toe is painful, inflamed with numbness. States pain was mild in the beginning and got worse yesterday. Reports also having left arm pain, inflamed for 3 weeks, unsure if they're related, denies neck pain. Admits taking Tylenol w/o much improvement. Admits similar pain in the past. Denies any fever, chills, headache, dizziness, n/v/d, abdominal pain, dysuria, hematuria, chest pain, sob, weakness, injury/trauma, urinary/fecal incontinence, saddle anesthesia, or any other complaints.

## 2020-06-06 NOTE — ED PROVIDER NOTE - NS CPE EDP MUSC LUMBAR LOC
no midline spine tenderness, + left paraspinal tenderness, no erythema, no edema, no obvious deformity b/l.

## 2020-06-07 LAB
CULTURE RESULTS: SIGNIFICANT CHANGE UP
SPECIMEN SOURCE: SIGNIFICANT CHANGE UP

## 2020-06-16 NOTE — ED PROVIDER NOTE - CPE EDP NEURO NORM
How Severe Is Your Skin Lesion?: mild Have Your Skin Lesions Been Treated?: not been treated Is This A New Presentation, Or A Follow-Up?: Skin Lesions Additional History: Most recent PE Feb 2020- WNL. No weight loss. No swollen glands. Kidney surgery April 2020 for kidney cancer. normal...

## 2020-08-03 ENCOUNTER — APPOINTMENT (OUTPATIENT)
Dept: ORTHOPEDIC SURGERY | Facility: CLINIC | Age: 55
End: 2020-08-03

## 2020-08-10 ENCOUNTER — APPOINTMENT (OUTPATIENT)
Dept: ORTHOPEDIC SURGERY | Facility: CLINIC | Age: 55
End: 2020-08-10

## 2020-08-24 ENCOUNTER — APPOINTMENT (OUTPATIENT)
Dept: UROLOGY | Facility: CLINIC | Age: 55
End: 2020-08-24

## 2020-12-08 ENCOUNTER — APPOINTMENT (OUTPATIENT)
Dept: ORTHOPEDIC SURGERY | Facility: CLINIC | Age: 55
End: 2020-12-08
Payer: COMMERCIAL

## 2020-12-08 VITALS
SYSTOLIC BLOOD PRESSURE: 134 MMHG | TEMPERATURE: 97.4 F | DIASTOLIC BLOOD PRESSURE: 85 MMHG | BODY MASS INDEX: 27.29 KG/M2 | WEIGHT: 154 LBS | HEART RATE: 83 BPM | HEIGHT: 63 IN

## 2020-12-08 DIAGNOSIS — M54.12 RADICULOPATHY, CERVICAL REGION: ICD-10-CM

## 2020-12-08 PROCEDURE — 99072 ADDL SUPL MATRL&STAF TM PHE: CPT

## 2020-12-08 PROCEDURE — 99215 OFFICE O/P EST HI 40 MIN: CPT

## 2021-01-26 ENCOUNTER — APPOINTMENT (OUTPATIENT)
Dept: ORTHOPEDIC SURGERY | Facility: CLINIC | Age: 56
End: 2021-01-26
Payer: MEDICAID

## 2021-01-26 VITALS — TEMPERATURE: 96.2 F

## 2021-01-26 PROCEDURE — 99214 OFFICE O/P EST MOD 30 MIN: CPT

## 2021-01-26 PROCEDURE — 99072 ADDL SUPL MATRL&STAF TM PHE: CPT

## 2021-08-10 ENCOUNTER — APPOINTMENT (OUTPATIENT)
Dept: ORTHOPEDIC SURGERY | Facility: CLINIC | Age: 56
End: 2021-08-10

## 2021-08-23 ENCOUNTER — APPOINTMENT (OUTPATIENT)
Dept: ORTHOPEDIC SURGERY | Facility: CLINIC | Age: 56
End: 2021-08-23
Payer: MEDICAID

## 2021-08-23 DIAGNOSIS — M51.26 OTHER INTERVERTEBRAL DISC DISPLACEMENT, LUMBAR REGION: ICD-10-CM

## 2021-08-23 DIAGNOSIS — M47.812 SPONDYLOSIS W/OUT MYELOPATHY OR RADICULOPATHY, CERVICAL REGION: ICD-10-CM

## 2021-08-23 PROCEDURE — 99214 OFFICE O/P EST MOD 30 MIN: CPT

## 2021-10-29 RX ORDER — MELOXICAM 15 MG/1
15 TABLET ORAL
Qty: 30 | Refills: 1 | Status: ACTIVE | COMMUNITY
Start: 2020-12-08 | End: 1900-01-01

## 2021-11-30 RX ORDER — MELOXICAM 15 MG/1
15 TABLET ORAL
Qty: 30 | Refills: 1 | Status: ACTIVE | COMMUNITY
Start: 2021-08-23 | End: 1900-01-01

## 2021-12-02 ENCOUNTER — APPOINTMENT (OUTPATIENT)
Dept: ORTHOPEDIC SURGERY | Facility: CLINIC | Age: 56
End: 2021-12-02
Payer: MEDICAID

## 2021-12-02 DIAGNOSIS — M50.30 OTHER CERVICAL DISC DEGENERATION, UNSPECIFIED CERVICAL REGION: ICD-10-CM

## 2021-12-02 DIAGNOSIS — M51.36 OTHER INTERVERTEBRAL DISC DEGENERATION, LUMBAR REGION: ICD-10-CM

## 2021-12-02 PROCEDURE — 72100 X-RAY EXAM L-S SPINE 2/3 VWS: CPT

## 2021-12-02 PROCEDURE — 72040 X-RAY EXAM NECK SPINE 2-3 VW: CPT

## 2021-12-02 PROCEDURE — 99214 OFFICE O/P EST MOD 30 MIN: CPT

## 2021-12-02 RX ORDER — MELOXICAM 15 MG/1
15 TABLET ORAL
Qty: 30 | Refills: 1 | Status: ACTIVE | COMMUNITY
Start: 2021-12-02 | End: 1900-01-01

## 2022-10-29 NOTE — ED PROVIDER NOTE - SKIN, MLM
Skin normal color for race, warm, dry and intact. No evidence of rash. [email or web address]

## 2023-02-04 NOTE — ED PROVIDER NOTE - CHIEF COMPLAINT
The patient is a 54y Female complaining of

## 2024-08-23 NOTE — ED PROVIDER NOTE - SKIN TEMP
I want to get into couples grief counseling . "My animals, my boyfriend and my parents and that fact that  my daughter wouldn't want me to give up on life just because she is not here"   warm